# Patient Record
Sex: MALE | Race: WHITE | NOT HISPANIC OR LATINO | URBAN - METROPOLITAN AREA
[De-identification: names, ages, dates, MRNs, and addresses within clinical notes are randomized per-mention and may not be internally consistent; named-entity substitution may affect disease eponyms.]

---

## 2017-06-08 ENCOUNTER — IMPORTED ENCOUNTER (OUTPATIENT)
Dept: URBAN - METROPOLITAN AREA CLINIC 31 | Facility: CLINIC | Age: 72
End: 2017-06-08

## 2017-06-08 PROBLEM — H40.003: Noted: 2017-06-08

## 2017-06-08 PROBLEM — H25.13: Noted: 2017-06-08

## 2017-06-08 PROCEDURE — 92015 DETERMINE REFRACTIVE STATE: CPT

## 2017-06-08 PROCEDURE — 92310 CONTACT LENS FITTING OU: CPT

## 2017-06-08 PROCEDURE — 92133 CPTRZD OPH DX IMG PST SGM ON: CPT

## 2017-06-08 PROCEDURE — 92014 COMPRE OPH EXAM EST PT 1/>: CPT

## 2017-06-08 NOTE — PATIENT DISCUSSION
1. Trace Nuclear Sclerotic Cataract OU: Explained how cataracts can effect vision. Recommend clinical observation. The patient was advised to contact us if any change or worsening of vision. 2.  5 Large Papillae under RENETTA tarsal conj-  Rx lotemax tid OS 14 days. Not to use on top of cls. If not better in 14 days then needs to be seen. 3.  Glaucoma suspect OU -  Very large ONH's. R>L. LVF 7/8/16 OD normal OS borderline. POss signs of glaucomatous damage to the optic nerve based on todays examination and testing. Will continue to monitor. Pach  486/482. OCT 6/8/17  RNFL OD borderline OS normal.  4.  Cont with current brand cls [employer] and rx is fine. 3.25-1.25x110/1.50-1.25x110 MONO. Eval 80.5. RTN 1 yr CE/OCT- alternate OCT and VF.

## 2017-10-24 ENCOUNTER — IMPORTED ENCOUNTER (OUTPATIENT)
Dept: URBAN - METROPOLITAN AREA CLINIC 31 | Facility: CLINIC | Age: 72
End: 2017-10-24

## 2017-10-24 NOTE — PATIENT DISCUSSION
1.  Pt gets his cls from [facility] [facility] [employer] and all boxes have given him trouble with dist and reading. Thinking of going back to [facility] but only wears 3x per week. Rx in gls is fine and MRX has not changed. Not sure what problem is with cls. 2.  Order trials of other dailies to try. Rotation of cls was off. Had OK markings. Pt can pu trials and let me know .

## 2018-06-13 ENCOUNTER — IMPORTED ENCOUNTER (OUTPATIENT)
Dept: URBAN - METROPOLITAN AREA CLINIC 31 | Facility: CLINIC | Age: 73
End: 2018-06-13

## 2018-06-13 PROBLEM — H10.412: Noted: 2018-06-13

## 2018-06-13 PROBLEM — H25.13: Noted: 2018-06-13

## 2018-06-13 PROBLEM — H40.003: Noted: 2018-06-13

## 2018-06-13 PROCEDURE — 92250 FUNDUS PHOTOGRAPHY W/I&R: CPT

## 2018-06-13 PROCEDURE — 92133 CPTRZD OPH DX IMG PST SGM ON: CPT

## 2018-06-13 PROCEDURE — 92015 DETERMINE REFRACTIVE STATE: CPT

## 2018-06-13 PROCEDURE — 92310 CONTACT LENS FITTING OU: CPT

## 2018-06-13 PROCEDURE — 92014 COMPRE OPH EXAM EST PT 1/>: CPT

## 2018-06-13 NOTE — PATIENT DISCUSSION
1. Trace Nuclear Sclerotic Cataract OU: Explained how cataracts can effect vision. The patient was advised to contact us if any change or worsening of vision. 2. Slight rx change3. Glaucoma suspect OU -  Very large ONH's. R>L. LVF 7/8/16 OD normal OS borderline. Borderliine signs of glaucomatous damage to the optic nerve based on todays examination and testing. Pach  486/482. OCT 6/13/18 OD thinning OS normal.  RNFL 79/87. Poor GCC OD 64/82. Return for VF in 2 mths to determine if needs to start meds. 4.  Will order more moist Daily Oasys 1 day 3.75-1.25x090/1.75-1.25x090  for more reading. MONO. Eval 80.5. RTN  2 mths VF6. RTN 1 yr CE/OCT- alternate OCT and VF.

## 2018-08-22 ENCOUNTER — IMPORTED ENCOUNTER (OUTPATIENT)
Dept: URBAN - METROPOLITAN AREA CLINIC 31 | Facility: CLINIC | Age: 73
End: 2018-08-22

## 2018-08-22 PROBLEM — H25.13: Noted: 2018-08-22

## 2018-08-22 PROBLEM — H10.412: Noted: 2018-08-22

## 2018-08-22 PROBLEM — H40.003: Noted: 2018-08-22

## 2018-08-22 PROCEDURE — 92083 EXTENDED VISUAL FIELD XM: CPT

## 2018-08-22 PROCEDURE — 99213 OFFICE O/P EST LOW 20 MIN: CPT

## 2018-08-22 NOTE — PATIENT DISCUSSION
1. Trace Nuclear Sclerotic Cataract OU: Explained how cataracts can effect vision. The patient was advised to contact us if any change or worsening of vision. 2. Slight rx change3. Glaucoma suspect OU -  Very large ONH's. R>L. LVF 8/22/18 Normal OU. Borderliine signs of glaucomatous damage to the optic nerve based on todays examination and testing. Pach  486/482. OCT 6/13/18 OD thinning OS normal.  RNFL 79/87. Poor GCC OD 64/82. No meds. 4.  Will order more moist Daily Oasys 1 day 4.00-1.25x090/1.75-1.25x090  for more reading. MONO. Eval 80.5.   RTN  6/18 CE/OCT-

## 2019-06-12 ENCOUNTER — IMPORTED ENCOUNTER (OUTPATIENT)
Dept: URBAN - METROPOLITAN AREA CLINIC 31 | Facility: CLINIC | Age: 74
End: 2019-06-12

## 2019-06-12 PROBLEM — H10.412: Noted: 2019-06-12

## 2019-06-12 PROBLEM — H25.13: Noted: 2019-06-12

## 2019-06-12 PROBLEM — H40.003: Noted: 2019-06-12

## 2019-06-12 PROCEDURE — 92015 DETERMINE REFRACTIVE STATE: CPT

## 2019-06-12 PROCEDURE — V2521 CNTCT LENS HYDROPHILIC TORIC: HCPCS

## 2019-06-12 PROCEDURE — 92133 CPTRZD OPH DX IMG PST SGM ON: CPT

## 2019-06-12 PROCEDURE — 92310 CONTACT LENS FITTING OU: CPT

## 2019-06-12 PROCEDURE — 92014 COMPRE OPH EXAM EST PT 1/>: CPT

## 2019-06-12 NOTE — PATIENT DISCUSSION
1. Trace Nuclear Sclerotic Cataract OU: Explained how cataracts can effect vision. The patient was advised to contact us if any change or worsening of vision. 2. Slight rx change3. Glaucoma suspect OU -  Very large ONH's. R>L. LVF 8/22/18 Normal OU. Borderliine signs of glaucomatous damage to the optic nerve based on todays examination and testing. Pach  486/482. OCT 6/12/19--stable from prev yr. OD thinning OS normal.  RNFL 79/87. Poor GCC OD 64/82. No meds. 4.  Cont with current cls--Daily Oasys 1 day 3.75-1.25x090/1.75-1.25x090  for more reading. MONO. Eval 90.5. RTN  12/19 VF6.   RTN 6/20 CE/OCT-

## 2019-12-13 ENCOUNTER — IMPORTED ENCOUNTER (OUTPATIENT)
Dept: URBAN - METROPOLITAN AREA CLINIC 31 | Facility: CLINIC | Age: 74
End: 2019-12-13

## 2020-01-14 ENCOUNTER — IMPORTED ENCOUNTER (OUTPATIENT)
Dept: URBAN - METROPOLITAN AREA CLINIC 31 | Facility: CLINIC | Age: 75
End: 2020-01-14

## 2020-01-14 PROBLEM — H25.13: Noted: 2020-01-14

## 2020-01-14 PROBLEM — H40.013: Noted: 2020-01-14

## 2020-01-14 PROBLEM — H40.003: Noted: 2020-01-14

## 2020-01-14 PROBLEM — H10.412: Noted: 2020-01-14

## 2020-01-14 PROCEDURE — 99213 OFFICE O/P EST LOW 20 MIN: CPT

## 2020-01-14 PROCEDURE — 92083 EXTENDED VISUAL FIELD XM: CPT

## 2020-01-14 PROCEDURE — 92250 FUNDUS PHOTOGRAPHY W/I&R: CPT

## 2020-01-14 NOTE — PATIENT DISCUSSION
1. Trace Nuclear Sclerotic Cataract OU: Explained how cataracts can effect vision. The patient was advised to contact us if any change or worsening of vision. 2. Slight rx change3. Glaucoma suspect OU -  Very large ONH's. R>L. LVF 1/14/20  OD Normal scattered OS. Borderliine signs of glaucomatous damage to the optic nerve based on todays examination and testing. Pach  486/482. OCT 6/12/19--stable from prev yr. OD thinning OS normal.  RNFL 79/87. Poor GCC OD 64/82. No meds. 4.  Cont with current cls--Daily Oasys 1 day 3.75-1.25x090/1.75-1.25x090  for more reading. MONO. Eval 90. Can pu OTC 1.00 for small print over cls.  5.  RTN 6/20 CE/OCT nerve-FU on ONH and GLC-

## 2020-06-17 ENCOUNTER — IMPORTED ENCOUNTER (OUTPATIENT)
Dept: URBAN - METROPOLITAN AREA CLINIC 31 | Facility: CLINIC | Age: 75
End: 2020-06-17

## 2020-06-17 PROBLEM — H10.412: Noted: 2020-06-17

## 2020-06-17 PROBLEM — H25.13: Noted: 2020-06-17

## 2020-06-17 PROBLEM — H40.1131: Noted: 2020-06-17

## 2020-06-17 PROBLEM — H40.003: Noted: 2020-06-17

## 2020-06-17 PROCEDURE — 92015 DETERMINE REFRACTIVE STATE: CPT

## 2020-06-17 PROCEDURE — 92133 CPTRZD OPH DX IMG PST SGM ON: CPT

## 2020-06-17 PROCEDURE — 92310 CONTACT LENS FITTING OU: CPT

## 2020-06-17 PROCEDURE — 92014 COMPRE OPH EXAM EST PT 1/>: CPT

## 2020-06-17 NOTE — PATIENT DISCUSSION
1.  Primary open angle glaucoma OU mild -    Rx Latanaprost qhs ou. Discussed importance of compliance. Will continue to monitor for stability or progression. Very large ONH's. R>L. --   LVF 1/14/20  OD Normal scattered OS. Borderliine signs of glaucomatous damage to the optic nerve based on todays examination and testing. Pach  486/482. OCT 6/17/20-- advanced thinning OD>OS--  OD thinning OS normal.  RNFL 76/82   Poor GCC OD 64/82. 2.  Order trials of change in rx for dist of Oasys 1 day 4.00-1.25x090/2.00-1.25x090  for more reading. MONO. Eval 90. Can pu OTC 1.00 for small print over cls. 3.  Nuclear Cataracts OU-- MILd-- monitor4. Rx change--wants to get lightweight gls to wear with his hearing aids--wants silhouettes. 5.  RTN 2 mths tack--after starting latanaprost check IOP6.   RTN 12/20  DF/VF optos----FU on ONH and GLC-

## 2020-06-17 NOTE — PATIENT DISCUSSION
1. Trace Nuclear Sclerotic Cataract OU: Explained how cataracts can effect vision. The patient was advised to contact us if any change or worsening of vision. 2. Slight rx change----Wants to get updated gls-silhouettes that are light weight and can wear with his hearing aid. 3.  Glaucoma suspect OU -  Very large ONH's. R>L. LVF 1/14/20  OD Normal scattered OS. Borderliine signs of glaucomatous damage to the optic nerve based on todays examination and testing. Pach  486/482. OCT 6/12/19--stable from prev yr. OD thinning OS normal.  RNFL 79/87. Poor GCC OD 64/82. No meds. 4.  Cont with current cls--Daily Oasys 1 day 3.75-1.25x090/1.75-1.25x090  for more reading. MONO. Eval 90. Can pu OTC 1.00 for small print over cls.  5.  RTN 6/20 CE/OCT nerve-FU on ONH and GLC-

## 2020-07-01 ENCOUNTER — IMPORTED ENCOUNTER (OUTPATIENT)
Dept: URBAN - METROPOLITAN AREA CLINIC 31 | Facility: CLINIC | Age: 75
End: 2020-07-01

## 2020-07-01 NOTE — PATIENT DISCUSSION
1.  Fit with DiabetOmics but not fitting well. OD sits at 7 and OS at 5. Vision great in [place] 1 day in same rx. Order Luciana and Ultra in  4.00-1.25x100/2.00-1.25x100. Pt can pu.

## 2020-09-10 ENCOUNTER — IMPORTED ENCOUNTER (OUTPATIENT)
Dept: URBAN - METROPOLITAN AREA CLINIC 31 | Facility: CLINIC | Age: 75
End: 2020-09-10

## 2020-09-10 PROBLEM — H25.13: Noted: 2020-09-10

## 2020-09-10 PROBLEM — H10.412: Noted: 2020-09-10

## 2020-09-10 PROBLEM — H40.003: Noted: 2020-09-10

## 2020-09-10 PROBLEM — H40.1131: Noted: 2020-09-10

## 2020-09-10 PROCEDURE — 99213 OFFICE O/P EST LOW 20 MIN: CPT

## 2020-09-10 NOTE — PATIENT DISCUSSION
1.  Primary open angle glaucoma OU mild -    IOP lowered with Latanaprost qhs ou. Discussed importance of compliance. Will continue to monitor for stability or progression. Very large ONH's. R>L. --   LVF 1/14/20  OD Normal scattered OS. Borderliine signs of glaucomatous damage to the optic nerve based on todays examination and testing. Pach  486/482. OCT 6/17/20-- advanced thinning OD>OS--  OD thinning OS normal.  RNFL 76/82   Poor GCC OD 64/82. 2.  Order trials of change in rx for dist of Oasys 1 day 4.00-1.25x090/2.00-1.25x090  for more reading. MONO. Eval 90. Can pu OTC 1.00 for small print over cls. 3.  Nuclear Cataracts OU-- MILd-- monitor4. Rx change--wants to get lightweight gls to wear with his hearing aids--wants silhouettes.  5.  RTN 12/20  DF/VF/ optos----FU on ONH and GLC-

## 2021-01-13 ENCOUNTER — IMPORTED ENCOUNTER (OUTPATIENT)
Dept: URBAN - METROPOLITAN AREA CLINIC 31 | Facility: CLINIC | Age: 76
End: 2021-01-13

## 2021-01-13 PROBLEM — H25.13: Noted: 2021-01-13

## 2021-01-13 PROBLEM — H40.1131: Noted: 2021-01-13

## 2021-01-13 PROBLEM — H40.003: Noted: 2021-01-13

## 2021-01-13 PROBLEM — H10.412: Noted: 2021-01-13

## 2021-01-13 PROCEDURE — 99214 OFFICE O/P EST MOD 30 MIN: CPT

## 2021-01-13 PROCEDURE — 92250 FUNDUS PHOTOGRAPHY W/I&R: CPT

## 2021-01-13 PROCEDURE — 92083 EXTENDED VISUAL FIELD XM: CPT

## 2021-01-13 NOTE — PATIENT DISCUSSION
1.  Primary open angle glaucoma OU mild -    IOP lowered with Latanaprost qhs ou. Discussed importance of compliance. Will continue to monitor for stability or progression. Very large ONH's. R>L. --   LVF 1/13/21  OD Normal scattered OS. Borderliine signs of glaucomatous damage to the optic nerve based on todays examination and testing. Pach  486/482. OCT 6/17/20-- advanced thinning OD>OS--  OD thinning OS normal.  RNFL 76/82   Poor GCC OD 64/82. 2.  Cont with dist of Oasys 1 day 4.00-1.25x090/2.00-1.25x090  for more reading. MONO. Eval 90. Can pu OTC 1.00 for small print over cls. 3.  Nuclear Cataracts OU-- MILd-- monitor4.   RTN 7/21 CE/OCT Nerve

## 2021-09-02 ENCOUNTER — IMPORTED ENCOUNTER (OUTPATIENT)
Dept: URBAN - METROPOLITAN AREA CLINIC 31 | Facility: CLINIC | Age: 76
End: 2021-09-02

## 2021-09-02 PROBLEM — H25.13: Noted: 2021-09-02

## 2021-09-02 PROBLEM — H10.412: Noted: 2021-09-02

## 2021-09-02 PROBLEM — H40.003: Noted: 2021-09-02

## 2021-09-02 PROBLEM — H40.1131: Noted: 2021-09-02

## 2021-09-02 PROBLEM — H40.013: Noted: 2021-09-02

## 2021-09-02 PROCEDURE — 92014 COMPRE OPH EXAM EST PT 1/>: CPT

## 2021-09-02 PROCEDURE — V2521 CNTCT LENS HYDROPHILIC TORIC: HCPCS

## 2021-09-02 PROCEDURE — 92133 CPTRZD OPH DX IMG PST SGM ON: CPT

## 2021-09-02 PROCEDURE — 92015 DETERMINE REFRACTIVE STATE: CPT

## 2021-09-02 PROCEDURE — 92310 CONTACT LENS FITTING OU: CPT

## 2021-09-02 NOTE — PATIENT DISCUSSION
1.  Primary open angle glaucoma OU mild - IOP OD too high at 17. Add Brimonidine bid  OD and cont Latanaprost qhs ou. Discussed importance of compliance. Very large ONH's. R>L. --LVF 1/13/21  OD Normal OS scattered. Pach  486/482. OCT 9/2/21-- advanced thinning OD>OS-- RNFL 66/78 from 76/82   Poor GCC OD 64/82. 2.  Cont with Oasys 1 day 4.00-1.25x090/2.00-1.25x090  for more reading. MONO. Eval 70. Can pu OTC 1.00 for small print over cls. 3.  Nuclear Cataracts OU--   getting worse--wants to have evaluation for cat sx-- Wants MF Torics--Wants no glasses. 4.  RTN  1 mth  Cat evnguyễn Reyes -- Discuss MF Torics and possible Istent?

## 2021-10-01 ENCOUNTER — IMPORTED ENCOUNTER (OUTPATIENT)
Dept: URBAN - METROPOLITAN AREA CLINIC 31 | Facility: CLINIC | Age: 76
End: 2021-10-01

## 2021-10-01 PROBLEM — H40.1131: Noted: 2021-10-01

## 2021-10-01 PROBLEM — H25.13: Noted: 2021-10-01

## 2021-10-01 PROBLEM — H10.412: Noted: 2021-10-01

## 2021-10-01 PROBLEM — H40.003: Noted: 2021-10-01

## 2021-10-01 PROCEDURE — 99213 OFFICE O/P EST LOW 20 MIN: CPT

## 2021-10-01 NOTE — PATIENT DISCUSSION
1.  Primary open angle glaucoma OU mild - IOP OD better with Brimonidine bid  OD and cont Latanaprost qhs ou. Discussed importance of compliance. Very large ONH's. R>L. --LVF 1/13/21  OD Normal OS scattered. Pach  486/482. OCT 9/2/21-- advanced thinning OD>OS-- RNFL 66/78 from 76/82   Poor GCC OD 64/82. 2.  Cont with Oasys 1 day 4.00-1.25x090/2.00-1.25x090  for more reading. MONO. Eval 70. Can pu OTC 1.00 for small print over cls. 3.  Nuclear Cataracts OU--   getting worse--wants to have evaluation for cat sx-- Wants MF Torics--Wants no glasses. I stent? 4. RTN  1 mth  Cat eval DR Reyes -- Discuss MF Torics and possible Istent?

## 2021-10-14 ENCOUNTER — IMPORTED ENCOUNTER (OUTPATIENT)
Dept: URBAN - METROPOLITAN AREA CLINIC 31 | Facility: CLINIC | Age: 76
End: 2021-10-14

## 2021-10-14 PROBLEM — H40.1131: Noted: 2021-10-14

## 2021-10-14 PROBLEM — H25.813: Noted: 2021-10-14

## 2021-10-14 PROCEDURE — 99213 OFFICE O/P EST LOW 20 MIN: CPT

## 2021-11-17 ENCOUNTER — IMPORTED ENCOUNTER (OUTPATIENT)
Dept: URBAN - METROPOLITAN AREA CLINIC 31 | Facility: CLINIC | Age: 76
End: 2021-11-17

## 2021-11-17 PROBLEM — H40.1131: Noted: 2021-11-17

## 2021-11-17 PROBLEM — H25.813: Noted: 2021-11-17

## 2021-11-17 PROCEDURE — 92025 CPTRIZED CORNEAL TOPOGRAPHY: CPT

## 2021-11-17 PROCEDURE — 92136 OPHTHALMIC BIOMETRY: CPT

## 2021-11-17 NOTE — PATIENT DISCUSSION
Primary open angle glaucoma OU mild - Continue with current treatment plan. Discussed importance of compliance. Will continue to monitor for stability or progression. Discusses risks and benefits of Omni with cataract surgery bleeding  infection IOP increase loss of vision.  Patient would like to proceed with OMNI w/ KPE/IOL

## 2021-11-17 NOTE — PATIENT DISCUSSION
Discussed the risks benefits alternatives and limitations of cataract surgery including infection bleeding loss of vision retinal tears detachment. The patient stated a full understanding and a desire to proceed with the procedure in both eyes. Refractive options were reviewed. Patient has elected to be optimized for distance vision in both eyes. The patient will still need glasses for reading and to possibly fine tune distance vision. Patient declines correcting astigmatism with Toric lens. Patient will wear glasses or Cl for astigmatism correction.

## 2021-11-30 ENCOUNTER — IMPORTED ENCOUNTER (OUTPATIENT)
Dept: URBAN - METROPOLITAN AREA CLINIC 31 | Facility: CLINIC | Age: 76
End: 2021-11-30

## 2021-11-30 PROBLEM — Z96.1: Noted: 2021-11-30

## 2021-11-30 PROCEDURE — 99024 POSTOP FOLLOW-UP VISIT: CPT

## 2021-11-30 NOTE — PATIENT DISCUSSION
1.  Pseudophakia OD - Post-Op Day #1 - Cataract Surgery Right Eye (OD) -  Dist with Omni Stent --doing well. Continue po drops as instructed. Call office with symptoms of pain redness or decreased vision right eye. Pt happy --He can read with his OTC readers. 2.  Cont GLC meds3. Cat OS -scheduled 1 week4.   RTN 1 week PO

## 2021-12-07 ENCOUNTER — IMPORTED ENCOUNTER (OUTPATIENT)
Dept: URBAN - METROPOLITAN AREA CLINIC 31 | Facility: CLINIC | Age: 76
End: 2021-12-07

## 2021-12-07 PROBLEM — Z96.1: Noted: 2021-12-07

## 2021-12-07 PROCEDURE — 99024 POSTOP FOLLOW-UP VISIT: CPT

## 2021-12-07 NOTE — PATIENT DISCUSSION
1.  Pseudophakia OS----Post-Op Day #1 - Cataract Surgery Left Eye (OS) - DIST with OMNI---doing well. Tears prn. Continue postop drops as directed. Call office with symptoms of pain redness or decreased vision in operative eye.  1 drop of tobramycin instilled2. Pseudophakia OD - 1 week PO - Cataract Surgery Right Eye (OD) -  Dist with Omni Stent --doing well. Continue po drops as instructed. Call office with symptoms of pain redness or decreased vision right eye. Pt happy --He can read with his OTC readers. 3.  IOP great OU with OMNI--Cont GLC meds4.   RTN 1 week PO

## 2021-12-14 ENCOUNTER — IMPORTED ENCOUNTER (OUTPATIENT)
Dept: URBAN - METROPOLITAN AREA CLINIC 31 | Facility: CLINIC | Age: 76
End: 2021-12-14

## 2021-12-14 PROBLEM — Z96.1: Noted: 2021-12-14

## 2021-12-14 PROCEDURE — 99024 POSTOP FOLLOW-UP VISIT: CPT

## 2021-12-14 NOTE — PATIENT DISCUSSION
1.  Pseudophakia OS----1 week PO- - DIST with OMNI---doing well. Tears prn. Continue postop drops as directed. Call office with symptoms of pain redness or decreased vision in operative eye.  1 drop of tobramycin instilled2. Pseudophakia OD - 2 week PO --  Dist with Omni Stent --doing well. Continue po drops as instructed. Call office with symptoms of pain redness or decreased vision right eye. Pt happy --He can read with his OTC readers. 3.  IOP great OU with OMNI--Cont GLC meds4. Vision great but is affceted by residulka astig--Pt wants to waiot on rx since he feels he is still changing5. Near is good--needs help dist.6.   RTN 1 mth  PO/MRX

## 2022-01-12 ENCOUNTER — IMPORTED ENCOUNTER (OUTPATIENT)
Dept: URBAN - METROPOLITAN AREA CLINIC 31 | Facility: CLINIC | Age: 77
End: 2022-01-12

## 2022-01-12 PROBLEM — Z96.1: Noted: 2022-01-12

## 2022-01-12 PROCEDURE — 99024 POSTOP FOLLOW-UP VISIT: CPT

## 2022-01-12 NOTE — PATIENT DISCUSSION
1.  Pseudophakia OU---DIST with OMNI--- Clear capsules ou.  2.  Dry eyes-- use Pf tears qid during day and add gels at bedtime --systane in tube or blink in bottle 3. Get rx Prog suns and Prog in clear pr. 4.  Glaucoma OU--  IOP great OU with OMNI--Dc both Latanaprost OU and Brimonidine OD. 5.  RTN 3 mths tack6.   RTN 5 mth  CE/OCT nerve

## 2022-03-04 ENCOUNTER — IMPORTED ENCOUNTER (OUTPATIENT)
Dept: URBAN - METROPOLITAN AREA CLINIC 31 | Facility: CLINIC | Age: 77
End: 2022-03-04

## 2022-03-04 PROBLEM — Z96.1: Noted: 2022-03-04

## 2022-03-04 PROCEDURE — 92310 CONTACT LENS FITTING OU: CPT

## 2022-03-04 NOTE — PATIENT DISCUSSION
1.  Pseudophakia OU - IOLs stable. Monitor for changes in vision. 2. Did fitting and pt did well-- MOD MONO 0..50-1.25x090/2.00-0.75x080. Pt was happy with visions but wants dailies-- Cls are on order and when trials come in pt can pu and try My Day   Eval 80. 3.  Pt c/o streaks at [place] with driving and wanted cls to help--explained to pt it probablky will not due to MONO.

## 2022-03-04 NOTE — PATIENT DISCUSSION
1.  Did fitting and pt did well-- MOD MONO 0..50-1.25x090/2.00-0.75x080. Pt was happy with visions but wants dailies-- Cls are on order and when trials come in pt can pu and try My Day   Eval 80.

## 2022-04-02 ASSESSMENT — VISUAL ACUITY
OD_SC: 20/20
OS_CC: 20/30-2
OS_CC: 20/40
OD_GLARE: 20/70+2
OS_CC: 20/20-1
OU_SC: 20/30+2
OD_CC: 20/30
OS_SC: 20/20
OS_SC: 20/30-1
OS_CC: 20/25
OD_SC: 20/50
OS_SC: 20/50
OD_CC: 20/20-2
OD_CC: 20/25
OS_SC: 20/25
OD_CC: 20/30
OS_SC: 20/30
OS_SC: 20/30
OS_GLARE: 20/80
OD_CC: 20/40+2
OS_CC: 20/30+1
OU_CC: 20/25
OD_SC: 20/25
OD_SC: 20/50
OD_SC: 20/20016''
OD_CC: 20/40
OD_PH: SC 20/25
OD_CC: 20/25
OD_SC: 20/30-2
OU_SC: 20/50
OS_SC: 20/20

## 2022-04-02 ASSESSMENT — TONOMETRY
OD_IOP_MMHG: 12
OS_IOP_MMHG: 20
OD_IOP_MMHG: 16
OS_IOP_MMHG: 16
OD_IOP_MMHG: 13
OD_IOP_MMHG: 10
OS_IOP_MMHG: 18
OD_IOP_MMHG: 10
OD_IOP_MMHG: 19
OD_IOP_MMHG: 17
OS_IOP_MMHG: 14
OD_IOP_MMHG: 18
OS_IOP_MMHG: 17
OS_IOP_MMHG: 18
OD_IOP_MMHG: 21
OD_IOP_MMHG: 11
OD_IOP_MMHG: 12
OS_IOP_MMHG: 11
OS_IOP_MMHG: 15
OS_IOP_MMHG: 11
OS_IOP_MMHG: 16
OD_IOP_MMHG: 15
OD_IOP_MMHG: 19
OD_IOP_MMHG: 11
OS_IOP_MMHG: 18
OS_IOP_MMHG: 10
OS_IOP_MMHG: 14
OS_IOP_MMHG: 14
OS_IOP_MMHG: 19
OD_IOP_MMHG: 18
OD_IOP_MMHG: 18
OS_IOP_MMHG: 11